# Patient Record
Sex: FEMALE | Race: WHITE | Employment: STUDENT | URBAN - METROPOLITAN AREA
[De-identification: names, ages, dates, MRNs, and addresses within clinical notes are randomized per-mention and may not be internally consistent; named-entity substitution may affect disease eponyms.]

---
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
## 2021-02-15 ENCOUNTER — OFFICE VISIT (OUTPATIENT)
Dept: FAMILY MEDICINE CLINIC | Age: 21
End: 2021-02-15
Payer: COMMERCIAL

## 2021-02-15 VITALS
OXYGEN SATURATION: 98 % | SYSTOLIC BLOOD PRESSURE: 108 MMHG | TEMPERATURE: 97.3 F | WEIGHT: 185.8 LBS | HEIGHT: 68 IN | HEART RATE: 81 BPM | BODY MASS INDEX: 28.16 KG/M2 | DIASTOLIC BLOOD PRESSURE: 63 MMHG

## 2021-02-15 DIAGNOSIS — F84.0 AUTISM SPECTRUM DISORDER: Primary | ICD-10-CM

## 2021-02-15 DIAGNOSIS — F79 INTELLECTUAL DISABILITY: ICD-10-CM

## 2021-02-15 DIAGNOSIS — G40.909 EPILEPSY UNDETERMINED AS TO FOCAL OR GENERALIZED (HCC): ICD-10-CM

## 2021-02-15 DIAGNOSIS — Z01.818 PRE-OPERATIVE CLEARANCE: ICD-10-CM

## 2021-02-15 DIAGNOSIS — Z87.898 HISTORY OF HEADACHE: ICD-10-CM

## 2021-02-15 DIAGNOSIS — Z11.59 NEED FOR HEPATITIS C SCREENING TEST: ICD-10-CM

## 2021-02-15 DIAGNOSIS — E66.3 OVERWEIGHT (BMI 25.0-29.9): ICD-10-CM

## 2021-02-15 DIAGNOSIS — E67.3 HYPERVITAMINOSIS D: ICD-10-CM

## 2021-02-15 PROBLEM — Z84.89 FAMILY HISTORY OF SUDDEN DEATH: Status: ACTIVE | Noted: 2017-01-30

## 2021-02-15 PROCEDURE — G8419 CALC BMI OUT NRM PARAM NOF/U: HCPCS | Performed by: FAMILY MEDICINE

## 2021-02-15 PROCEDURE — G8427 DOCREV CUR MEDS BY ELIG CLIN: HCPCS | Performed by: FAMILY MEDICINE

## 2021-02-15 PROCEDURE — 1036F TOBACCO NON-USER: CPT | Performed by: FAMILY MEDICINE

## 2021-02-15 PROCEDURE — 99213 OFFICE O/P EST LOW 20 MIN: CPT | Performed by: FAMILY MEDICINE

## 2021-02-15 PROCEDURE — G8484 FLU IMMUNIZE NO ADMIN: HCPCS | Performed by: FAMILY MEDICINE

## 2021-02-15 SDOH — ECONOMIC STABILITY: TRANSPORTATION INSECURITY
IN THE PAST 12 MONTHS, HAS LACK OF TRANSPORTATION KEPT YOU FROM MEETINGS, WORK, OR FROM GETTING THINGS NEEDED FOR DAILY LIVING?: NOT ASKED

## 2021-02-15 SDOH — ECONOMIC STABILITY: TRANSPORTATION INSECURITY
IN THE PAST 12 MONTHS, HAS THE LACK OF TRANSPORTATION KEPT YOU FROM MEDICAL APPOINTMENTS OR FROM GETTING MEDICATIONS?: NOT ASKED

## 2021-02-15 ASSESSMENT — PATIENT HEALTH QUESTIONNAIRE - PHQ9
SUM OF ALL RESPONSES TO PHQ9 QUESTIONS 1 & 2: 0
SUM OF ALL RESPONSES TO PHQ QUESTIONS 1-9: 2
1. LITTLE INTEREST OR PLEASURE IN DOING THINGS: 1
SUM OF ALL RESPONSES TO PHQ QUESTIONS 1-9: 2
SUM OF ALL RESPONSES TO PHQ9 QUESTIONS 1 & 2: 2
SUM OF ALL RESPONSES TO PHQ QUESTIONS 1-9: 0
SUM OF ALL RESPONSES TO PHQ QUESTIONS 1-9: 0

## 2021-02-15 ASSESSMENT — ENCOUNTER SYMPTOMS
BACK PAIN: 0
SHORTNESS OF BREATH: 0
EYE DISCHARGE: 0
BLOOD IN STOOL: 0
RECTAL PAIN: 0
EYE PAIN: 0
COLOR CHANGE: 0
CONSTIPATION: 0
NAUSEA: 0
SINUS PRESSURE: 0
VOICE CHANGE: 0
TROUBLE SWALLOWING: 0
ABDOMINAL PAIN: 0
VOMITING: 0
CHEST TIGHTNESS: 0
ANAL BLEEDING: 0
EYE REDNESS: 0
ABDOMINAL DISTENTION: 0
COUGH: 0
DIARRHEA: 0

## 2021-02-15 NOTE — PROGRESS NOTES
Subjective:      Patient ID: Fidelia Cerna is a 24 y.o. female. HPI Here for a clearance for IUD placement to regulate her periods. Seen Dr Lakia Jackson for her heavy periods but was unable to complete the eval as OP as patient was very   Nervous , so planning to have this done under anesthesia. States mood, sleep, appetite have been stable. Bowels are ok. Not sexually active ever- so Dr Lakia Jackson has not recommended a chlyamidia screen for her or pelvis-   but I did recommend that she update the HPV shots for the future. Has H/O seizures in the past - not on meds for seizures for over 6 years and has not had a seizure for this time. Used to see neuro Q 6 months, when she came off elavil 2 years ago- stopped seeing them regularly. Gets HA a couple of times a month - usually one dose  ibuprofen takes care of this. Not had a severe one needing immitrex for over 2 years now. Has pre op labs scheduled for March 2 nd and surgery scheduled fro March 10 th. Review of Systems   Constitutional: Negative for activity change, appetite change and fatigue. HENT: Negative for dental problem, ear pain, hearing loss, postnasal drip, sinus pressure, sneezing, tinnitus, trouble swallowing and voice change. Eyes: Negative for pain, discharge, redness and visual disturbance. Respiratory: Negative for cough, chest tightness and shortness of breath. Cardiovascular: Negative for chest pain, palpitations and leg swelling. Gastrointestinal: Negative for abdominal distention, abdominal pain, anal bleeding, blood in stool, constipation, diarrhea, nausea, rectal pain and vomiting. Endocrine: Negative for cold intolerance, heat intolerance, polydipsia, polyphagia and polyuria. Genitourinary: Negative for decreased urine volume, difficulty urinating, dyspareunia, dysuria, enuresis, flank pain, frequency, genital sores, hematuria, menstrual problem, pelvic pain, urgency, vaginal bleeding and vaginal discharge. Musculoskeletal: Negative for arthralgias, back pain, gait problem, joint swelling, myalgias, neck pain and neck stiffness. Skin: Negative for color change, pallor and rash. Allergic/Immunologic: Negative for environmental allergies, food allergies and immunocompromised state. Neurological: Negative for dizziness, tremors, seizures, syncope, facial asymmetry, speech difficulty, weakness, light-headedness, numbness and headaches. Hematological: Negative for adenopathy. Does not bruise/bleed easily. Psychiatric/Behavioral: Negative for agitation, behavioral problems, confusion, decreased concentration, sleep disturbance and suicidal ideas. The patient is not nervous/anxious. Objective:   Physical Exam  Constitutional:       General: She is not in acute distress. HENT:      Head: Normocephalic and atraumatic. Right Ear: External ear normal.      Left Ear: External ear normal.   Eyes:      Conjunctiva/sclera: Conjunctivae normal.      Pupils: Pupils are equal, round, and reactive to light. Neck:      Musculoskeletal: Normal range of motion. Thyroid: No thyromegaly. Trachea: No tracheal deviation. Cardiovascular:      Rate and Rhythm: Normal rate and regular rhythm. Heart sounds: No murmur. No friction rub. No gallop. Pulmonary:      Effort: Pulmonary effort is normal. No respiratory distress. Breath sounds: No stridor. No wheezing or rales. Chest:      Chest wall: No tenderness. Abdominal:      General: Bowel sounds are normal. There is no distension. Palpations: Abdomen is soft. Tenderness: There is no abdominal tenderness. There is no rebound. Musculoskeletal: Normal range of motion. Lymphadenopathy:      Cervical: No cervical adenopathy. Skin:     General: Skin is warm. Coloration: Skin is not pale. Findings: No erythema or rash. Neurological:      General: No focal deficit present. Mental Status: She is alert and oriented to person, place, and time. Mental status is at baseline. Cranial Nerves: No cranial nerve deficit. Motor: No abnormal muscle tone. Deep Tendon Reflexes: Reflexes normal.   Psychiatric:         Mood and Affect: Mood normal.         Behavior: Behavior normal.         Assessment:       Diagnosis Orders   1. Autism spectrum disorder     2. Epilepsy undetermined as to focal or generalized ([address]     3. Intellectual disability     4. Overweight (BMI 25.0-29.9)     5. History of headache     6. Hypervitaminosis D  Vitamin D 25 Hydroxy   7. Need for hepatitis C screening test  Hepatitis C Antibody   8. Pre-operative clearance  CBC    Comprehensive Metabolic Panel         Plan:      Orders Placed This Encounter   Procedures    CBC    Comprehensive Metabolic Panel    Hepatitis C Antibody    Vitamin D 25 Hydroxy       Outpatient Encounter Medications as of 2/15/2021   Medication Sig Dispense Refill    vitamin E 100 units capsule Take 180 Units by mouth daily      Thiamine 50 MG CAPS 50 mg daily      Riboflavin 100 MG TABS Take 300 mg by mouth daily      polyethylene glycol (GLYCOLAX) 17 GM/SCOOP powder take 1 CAPFUL (17GM) (DISSOLVED IN WATER) by mouth once daily      Magnesium Oxide 420 MG TABS Take 420 mg by mouth daily      ibuprofen (ADVIL;MOTRIN) 200 MG tablet Take 600 mg by mouth every 6 hours as needed      Docosahexaenoic Acid--100 MG CAPS Take 1,000 mg by mouth daily      Coenzyme Q-10 200 MG CAPS Take 200 mg by mouth 2 times daily      [DISCONTINUED] SUMAtriptan (IMITREX) 25 MG tablet TAKE 1 TABLET AT HEADACHE ONSET. MAY REPEAT 1 TABLET IN 2 HOURS IF NEEDED.   MAX OF 2 TABS/24 HOURS      [DISCONTINUED] promethazine (PHENERGAN) 25 MG tablet take 1 tablet by mouth every 6 hours if needed for nausea      [DISCONTINUED] lidocaine-prilocaine (EMLA) 2.5-2.5 % cream Apply topically  [DISCONTINUED] Cholecalciferol (VITAMIN D3) 25 MCG (1000 UT) CAPS Take 1,000 Units by mouth daily      [DISCONTINUED] TURMERIC PO Take 1,000 mg by mouth daily       No facility-administered encounter medications on file as of 2/15/2021.             Eden Amaya MD

## 2021-12-29 ENCOUNTER — HOSPITAL ENCOUNTER (EMERGENCY)
Facility: CLINIC | Age: 21
Discharge: HOME | End: 2021-12-30
Attending: EMERGENCY MEDICINE
Payer: COMMERCIAL

## 2021-12-29 VITALS
HEART RATE: 97 BPM | DIASTOLIC BLOOD PRESSURE: 84 MMHG | OXYGEN SATURATION: 100 % | RESPIRATION RATE: 18 BRPM | SYSTOLIC BLOOD PRESSURE: 128 MMHG

## 2021-12-29 DIAGNOSIS — R07.89 ATYPICAL CHEST PAIN: Primary | ICD-10-CM

## 2021-12-29 PROCEDURE — 93005 ELECTROCARDIOGRAM TRACING: CPT

## 2021-12-29 PROCEDURE — 99283 EMERGENCY DEPT VISIT LOW MDM: CPT

## 2021-12-29 ASSESSMENT — PAIN SCALES - GENERAL: PAINLEVEL_OUTOF10: 9

## 2021-12-30 ENCOUNTER — APPOINTMENT (OUTPATIENT)
Dept: GENERAL RADIOLOGY | Facility: CLINIC | Age: 21
End: 2021-12-30
Payer: COMMERCIAL

## 2021-12-30 LAB
DIRECT EXAM: NORMAL
EKG ATRIAL RATE: 92 BPM
EKG P AXIS: 61 DEGREES
EKG P-R INTERVAL: 124 MS
EKG Q-T INTERVAL: 346 MS
EKG QRS DURATION: 78 MS
EKG QTC CALCULATION (BAZETT): 427 MS
EKG R AXIS: 44 DEGREES
EKG T AXIS: 64 DEGREES
EKG VENTRICULAR RATE: 92 BPM
Lab: NORMAL
SPECIMEN DESCRIPTION: NORMAL

## 2021-12-30 PROCEDURE — 6360000002 HC RX W HCPCS: Performed by: EMERGENCY MEDICINE

## 2021-12-30 PROCEDURE — 6370000000 HC RX 637 (ALT 250 FOR IP): Performed by: EMERGENCY MEDICINE

## 2021-12-30 PROCEDURE — 87880 STREP A ASSAY W/OPTIC: CPT

## 2021-12-30 PROCEDURE — 96372 THER/PROPH/DIAG INJ SC/IM: CPT

## 2021-12-30 PROCEDURE — 71045 X-RAY EXAM CHEST 1 VIEW: CPT

## 2021-12-30 RX ORDER — KETOROLAC TROMETHAMINE 30 MG/ML
30 INJECTION, SOLUTION INTRAMUSCULAR; INTRAVENOUS ONCE
Status: COMPLETED | OUTPATIENT
Start: 2021-12-30 | End: 2021-12-30

## 2021-12-30 RX ORDER — MAGNESIUM HYDROXIDE/ALUMINUM HYDROXICE/SIMETHICONE 120; 1200; 1200 MG/30ML; MG/30ML; MG/30ML
30 SUSPENSION ORAL ONCE
Status: COMPLETED | OUTPATIENT
Start: 2021-12-30 | End: 2021-12-30

## 2021-12-30 RX ADMIN — MAGNESIUM HYDROXIDE/ALUMINUM HYDROXICE/SIMETHICONE 30 ML: 120; 1200; 1200 SUSPENSION ORAL at 00:32

## 2021-12-30 RX ADMIN — KETOROLAC TROMETHAMINE 30 MG: 30 INJECTION, SOLUTION INTRAMUSCULAR; INTRAVENOUS at 00:32

## 2021-12-30 NOTE — ED PROVIDER NOTES
eMERGENCY dEPARTMENT eNCOUnter      Pt Name: Venus Galo  MRN: [id number]  [place] 2000  Date of evaluation: 12/29/2021      CHIEF COMPLAINT       Chief Complaint   Patient presents with    Chest Pain     onset 1 hour after eating          HISTORY OF PRESENT ILLNESS    Venus Galo is a 22 y.o. female who presents emergency department complaining of sharp chest pain that happened about 1 hour after she had a meal.  Patient has no nausea no vomiting no diaphoresis no shortness of breath. Has history of anxiety headache and seizure disorder. She is afebrile nontoxic looking has been tested for coronavirus which is negative. REVIEW OF SYSTEMS       PAST MEDICAL HISTORY    has a past medical history of Anxiety, Headache, and Seizures ([facility] [address] SURGICAL HISTORY      has a past surgical history that includes Tympanostomy tube placement. CURRENT MEDICATIONS       Previous Medications    COENZYME Q-10 200 MG CAPS    Take 200 mg by mouth 2 times daily    DOCOSAHEXAENOIC ACID--100 MG CAPS    Take 1,000 mg by mouth daily    IBUPROFEN (ADVIL;MOTRIN) 200 MG TABLET    Take 600 mg by mouth every 6 hours as needed    MAGNESIUM OXIDE 420 MG TABS    Take 420 mg by mouth daily    POLYETHYLENE GLYCOL (GLYCOLAX) 17 GM/SCOOP POWDER    take 1 CAPFUL (17GM) (DISSOLVED IN WATER) by mouth once daily    RIBOFLAVIN 100 MG TABS    Take 300 mg by mouth daily    THIAMINE 50 MG CAPS    50 mg daily    VITAMIN E 100 UNITS CAPSULE    Take 180 Units by mouth daily       ALLERGIES     is allergic to sulfa antibiotics. FAMILY HISTORY     She indicated that her mother is alive. She indicated that her father is alive. family history is not on file. SOCIAL HISTORY      reports that she has never smoked. She has never used smokeless tobacco. She reports that she does not drink alcohol and does not use drugs. PHYSICAL EXAM     INITIAL VITALS:  blood pressure is 128/84 and her pulse is 97.  Her respiration is 18 and oxygen saturation is 100%. Constitutional: Alert, oriented x3, nontoxic, afebrile, answering questions appropriately, acting properly for age, in no acute distress  HEENT: Extraocular muscles intact, mucus membranes moist, TMs clear bilaterally, no posterior pharyngeal erythema or exudates, Pupils equal, round, reactive to light,   Neck: Trachea midline, Supple without lymphadenopathy, no posterior midline neck tenderness to palpation  Cardiovascular: Regular rhythm and rate no S3, S4, or murmurs  Respiratory: Clear to auscultation bilaterally no wheezes, rhonchi, rales, no respiratory distress  Gastrointestinal: Soft, nontender, nondistended, positive bowel sounds. No rebound, rigidity, or guarding. Musculoskeletal: No extremity pain or swelling  Neurologic: Moving all 4 extremities without difficulty there are no gross focal neurologic deficits  Skin: Warm and dry      DIFFERENTIAL DIAGNOSIS/ MDM:     Pain most likely musculoskeletal in etiology patient will get a portable chest x-ray some medicines and EKG and some Maalox and will reevaluate her. I do not believe this patient needs cardiac enzymes. DIAGNOSTIC RESULTS     EKG: All EKG's are interpreted by the Emergency Department Physician who either signs or Co-signs this chart in the absence of a cardiologist.    EKG is a sinus rhythm at 92 bpm, OK interval 0.124, QRS duration 0.078, QTc 427 ms. No ST or T wave changes indicating ischemia. Not indicated unless otherwise documented above    LABS:  Results for orders placed or performed during the hospital encounter of 12/29/21   Strep Screen Group A Throat    Specimen: Throat   Result Value Ref Range    Specimen Description . THROAT     Special Requests NOT REPORTED     Direct Exam       Rapid Strep A negative. A negative Rapid Group A Strep Screen result does not rule out the possibility of Group A Streptococci in the specimen.  A Group A Strep DNA test is available upon request.       Not indicated unless otherwise documented above    RADIOLOGY:   I reviewed the radiologist interpretations:  XR CHEST PORTABLE   Final Result   Normal examination. Not indicated unless otherwise documented above    EMERGENCY DEPARTMENT COURSE:     The patient was given the following medications:  Orders Placed This Encounter   Medications    ketorolac (TORADOL) injection 30 mg    aluminum & magnesium hydroxide-simethicone (MAALOX) 427-346-86 MG/5ML suspension 30 mL        Vitals:    Vitals:    12/29/21 2356   BP: 128/84   Pulse: 97   Resp: 18   SpO2: 100%     -------------------------  /84   Pulse 97   Resp 18   SpO2 100%         I have reviewed the disposition diagnosis with the patient and or their family/guardian. I have answered their questions and given discharge instructions. They voiced understanding of these instructions and did not have any further questions or complaints. CRITICAL CARE:    None    CONSULTS:    None    PROCEDURES:    None      OARRS Report if indicated             FINAL IMPRESSION      1. Atypical chest pain          DISPOSITION/PLAN   DISPOSITION Decision To Discharge  I have reviewed the disposition diagnosis with the patient and or their family/guardian. I have answered their questions and given discharge instructions. They voiced understanding of these instructions and did not have any further questions or complaints. Reevaluation: Patient's x-ray is negative patient's EKG has been unremarkable. I believe patient has musculoskeletal chest pain uncertain etiology. Patient has not shown any hypoxemia or tachycardia patient can be discharged home to follow-up with her own doctors as she is got some mild relief from Toradol.     PATIENT REFERRED TO:  Leo Griffin MD  [facility]  [place] [phone]    In 2 days        DISCHARGE MEDICATIONS:  New Prescriptions    No medications on file       (Please note that portions of this note were completed with a voice recognition program.  Efforts were made to edit the dictations but occasionally words are mis-transcribed.)    Yunior Tamyao MD  Attending Emergency Physician            Yunior Tamayo MD  12/30/21 [id number]

## 2023-04-24 ENCOUNTER — HOSPITAL ENCOUNTER (OUTPATIENT)
Dept: OCCUPATIONAL THERAPY | Age: 23
Setting detail: THERAPIES SERIES
Discharge: HOME | End: 2023-04-24
Payer: MEDICAID

## 2023-04-24 PROCEDURE — 97537 COMMUNITY/WORK REINTEGRATION: CPT

## 2023-04-24 PROCEDURE — 97166 OT EVAL MOD COMPLEX 45 MIN: CPT

## 2023-04-24 NOTE — PROGRESS NOTES
[facility]  Rehabilitation Services   Occupational Therapy  Evaluation  Date: 23  Patient Name: Kelvin Mclean      MRN: [id number]  Account: [de-identified]   : 2000  (25 y.o.)  Gender: female     [facility] Occup[facility]              [address] [place] [address] [address]              Phone: [phone]              Fax: [phone]     Occupational Therapy Initial  Evaluation      Referring Physician: Roland Aguayo MD             Insurance: [address] [place] [address],[facility];  visits; [address] AFTER EVAL  Primary Care Physician: Ella Son    Medical Diagnostic Code(s): Autism spectrum disorder   Rehab Diagnostic Code(s): Development delay     Date of symptom onset: Developmental delay  Next 's appt.:     Total Visits:                  Cx/Ns: 0    Precautions: Mild intellectual disability    Fall Risk/Comorbidities: Cardiomyopathy, sleep disorder    Subjective: Patient working part time at a [employer] and would like to be able to transport herself to work and back. Objective:    Reason for Referral: [place] with community mobility including transportation to work. Medical History: Autism spectrum disorder with developmental delay. Sees developmental specialist routinely at [facility]. Difficulty coping with change. Working at [employer] 12 hours per week. Long history of sleep disorder. Baseline fatigue. Annual visits with Cardiology at [facility] for a rare mitochondrial disorder and cardiomyopathy. Seizure disorder, last seizure 9 years ago. History of headaches, increased frequency recently. Seizure History: patient has been seizure free for the past 9 years and has been off of seizure medications for the past 7 years. Current Medications:    Coenzyme Q-10 200 MG CAPS Take 1 Cap (200 mg total) by mouth 2 times a day.  60 Cap 12    Docosahexaenoic Acid (DHA PO) Take

## 2023-05-15 ENCOUNTER — HOSPITAL ENCOUNTER (OUTPATIENT)
Dept: OCCUPATIONAL THERAPY | Age: 23
Setting detail: THERAPIES SERIES
Discharge: HOME | End: 2023-05-15
Payer: MEDICAID

## 2023-05-15 PROCEDURE — 97537 COMMUNITY/WORK REINTEGRATION: CPT

## 2023-05-22 ENCOUNTER — HOSPITAL ENCOUNTER (OUTPATIENT)
Dept: OCCUPATIONAL THERAPY | Age: 23
Setting detail: THERAPIES SERIES
Discharge: HOME | End: 2023-05-22
Payer: MEDICAID

## 2023-05-22 PROCEDURE — 97537 COMMUNITY/WORK REINTEGRATION: CPT

## 2023-06-05 ENCOUNTER — HOSPITAL ENCOUNTER (OUTPATIENT)
Dept: OCCUPATIONAL THERAPY | Age: 23
Setting detail: THERAPIES SERIES
Discharge: HOME | End: 2023-06-05
Payer: MEDICAID

## 2023-06-05 PROCEDURE — 97537 COMMUNITY/WORK REINTEGRATION: CPT

## 2023-06-05 NOTE — PROGRESS NOTES
visual skills, vehicle positioning and handling skills and strategic skills required for driving. Patient Stated Goals: To secure her temporary driving permit. Pt. Education:  [x] Yes   [x] Reviewed   Method of Education: [x] Verbal  [] Demo    Comprehension of Education:  [x] Verbalizes understanding. [] Demonstrates understanding. [x] Needs review.   [] Demonstrates/verbalizes      PLAN (FREQUENCY & DURATION): 1 time per week for a total of up to 30 visits      Treatment Charges: Mins Units   [] Evaluation       [x] Community Reintegration 60 4   Total Treatment Time 60        Time In: 11:00 am Time Out: 12:00 pm      Electronically signed by: Kendra Tinsley MS, OTR/L, LICDC, CDRS

## 2023-06-19 ENCOUNTER — HOSPITAL ENCOUNTER (OUTPATIENT)
Dept: OCCUPATIONAL THERAPY | Age: 23
Setting detail: THERAPIES SERIES
Discharge: HOME | End: 2023-06-19
Payer: MEDICAID

## 2023-06-19 PROCEDURE — 97537 COMMUNITY/WORK REINTEGRATION: CPT

## 2023-06-19 NOTE — PROGRESS NOTES
[facility]  Rehabilitation Services  Occupational Therapy  Rehabilitation Treatment Note  Date: 23  Patient Name: Juan Antonio Boyd    MRN: [id number]  Account: [de-identified]   : 2000  (25 y.o.) Gender: female     [facility] Outpatient Occupational Therapy              [address] [facility] [address]              Phone: [phone]              Fax: [phone]     Occupational Therapy Initial  Evaluation      Referring Physician: Magda Aguayo MD             Insurance: [address] [place] [address],[facility];  visits; Jakobi 69 AFTER EVAL  Primary Care Physician: Barrett Leahy     Medical Diagnostic Code(s): Autism spectrum disorder   Rehab Diagnostic Code(s): Development delay      Date of symptom onset: Developmental delay  Next 's appt.:      Total Visits:                       Cx/Ns: 0     Precautions: Mild intellectual disability     Fall Risk/Comorbidities: Cardiomyopathy, sleep disorder     Subjective: Patient working part time at a [employer] and would like to be able to transport herself to work and back; patient had 4 wisdom teeth removed on 21 and reported feeling fatigue and intermittent pain. Objective:     Reason for Referral: [place] with community mobility including transportation to work with diagnosis of Autism spectrum disorder with developmental delay. Participation in Rehabilitation Therapies: patient has participated on comprehensive OT & SLP in school and in SLP as an outpatient and outpatient at [address] [place] [address] intervention training for family to optimize the learning environment ages 5-8 y/o. Patient had IQ testing and she required special education in school and has a 3rd grade reading level & uses assistive devices for math. Driving History: patient is hoping to pursue her temporary driving permit.   Driving Goals: patient hoping to pursue local, daytime

## 2023-07-10 ENCOUNTER — HOSPITAL ENCOUNTER (OUTPATIENT)
Dept: OCCUPATIONAL THERAPY | Age: 23
Setting detail: THERAPIES SERIES
Discharge: HOME | End: 2023-07-10
Payer: MEDICAID

## 2023-07-10 PROCEDURE — 97537 COMMUNITY/WORK REINTEGRATION: CPT

## 2023-07-10 NOTE — PROGRESS NOTES
Demonstrates understanding. [x] Needs review.   [] Demonstrates/verbalizes      PLAN (FREQUENCY & DURATION): 1 time per week for a total of up to 30 visits      Treatment Charges: Mins Units   [] Evaluation       [x] Community Reintegration 60 4   Total Treatment Time 60        Time In: 12:45:00 pm Time Out: 1:45 pm      Electronically signed by: Naren Vizcaino MS, OTR/L, LICDC, CDRS

## 2023-07-17 ENCOUNTER — HOSPITAL ENCOUNTER (OUTPATIENT)
Dept: OCCUPATIONAL THERAPY | Age: 23
Setting detail: THERAPIES SERIES
Discharge: HOME | End: 2023-07-17
Payer: MEDICAID

## 2023-07-17 PROCEDURE — 97537 COMMUNITY/WORK REINTEGRATION: CPT

## 2023-07-24 ENCOUNTER — HOSPITAL ENCOUNTER (OUTPATIENT)
Dept: OCCUPATIONAL THERAPY | Age: 23
Setting detail: THERAPIES SERIES
Discharge: HOME | End: 2023-07-24
Payer: MEDICAID

## 2023-07-24 PROCEDURE — 97537 COMMUNITY/WORK REINTEGRATION: CPT
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
## 2023-08-01 ENCOUNTER — HOSPITAL ENCOUNTER (OUTPATIENT)
Dept: OCCUPATIONAL THERAPY | Age: 23
Setting detail: THERAPIES SERIES
Discharge: HOME | End: 2023-08-01
Payer: MEDICAID

## 2023-08-01 PROCEDURE — 97537 COMMUNITY/WORK REINTEGRATION: CPT

## 2023-08-01 NOTE — PROGRESS NOTES
[facility]  Occupational Therapy  Rehabilitation Treatment Note  Date: 23  Patient Name: Bonifacio Correa    MRN: [id number]  Account: [de-identified]   : 2000  (25 y.o.) Gender: female     [facility] Out[facility] Occupational Therapy              [address]              Phone: [phone]              Fax: [phone]       Referring Physician: Christine Aguayo MD             Insurance:  [facility];  visits; [facility]  Primary Care Physician: Ilda Childs     Medical Diagnostic Code(s): Autism spectrum disorder   Rehab Diagnostic Code(s): Development delay      Date of symptom onset: Developmental delay  Next 's appt.:      Total Visits:                       Cx/Ns: 0     Precautions: Mild intellectual disability     Fall Risk/Comorbidities: Cardiomyopathy, sleep disorder     Subjective: Patient reported that she plans to request a  to assist her with studying for her written driving examination. working part time at [employer] and would like to be able to transport herself to work and back; patient reports feeling 70% confident of her skills. Objective:     Reason for Referral: [place] with community mobility including transportation to work with diagnosis of Autism spectrum disorder with developmental delay. Participation in Rehabilitation Therapies: patient has participated on comprehensive OT & SLP in school and in SLP as an outpatient and outpatient at [address] [place] [address] intervention training for family to optimize the learning environment ages 10-10 y/o. Patient had IQ testing and she required special education in school and has a 3rd grade reading level & uses assistive devices for math. Driving History: patient is hoping to pursue her temporary driving permit.      Driving Goals: patient hoping to pursue local,

## 2023-09-11 ENCOUNTER — HOSPITAL ENCOUNTER (OUTPATIENT)
Dept: OCCUPATIONAL THERAPY | Age: 23
Setting detail: THERAPIES SERIES
Discharge: HOME | End: 2023-09-11
Payer: MEDICAID

## 2023-09-11 PROCEDURE — 97537 COMMUNITY/WORK REINTEGRATION: CPT

## 2023-09-11 NOTE — PROGRESS NOTES
[facility]  Occupational Therapy  Rehabilitation  Treatment Note    Date: 23  Patient Name: Yenny Barrientos    MRN: [id number]  Account: [de-identified]   : 2000  (25 y.o.) Gender: female     [facility] Outpatient Occupational Therapy              [address]              Phone: [phone]              Fax: [phone]       Referring Physician: Rosalind Aguayo MD             Insurance:  [employer] [facility];  visits; [facility]  Primary Care Physician: Ashanti Anand     Medical Diagnostic Code(s): Autism spectrum disorder   Rehab Diagnostic Code(s): Development delay      Date of symptom onset: Developmental delay  Next Dr.'s appt.:      Total Visits: 10/30                      Cx/Ns: 0     Precautions: Mild intellectual disability     Fall Risk/Comorbidities: Cardiomyopathy, sleep disorder     Subjective: Patient reported that she plans to request a  to assist her with studying for her written driving examination. Objective:     Reason for Referral: [place] with community mobility including transportation to work (employed at [employer], 12 hours per week). Medical History: Autism spectrum disorder with developmental delay & is followed by a Developmental Specialist at [facility]. History of sleep disorder. Baseline fatigue. Annual visits with Cardiology at [facility] for a rare mitochondrial disorder and cardiomyopathy. Seizure disorder, last seizure 9 years ago. History of headaches, increased frequency recently. Seizure History: patient has been seizure free for the past 9 years and has been off of seizure medications for the past 7 years.      Participation in Rehabilitation Therapies: patient has participated on comprehensive OT & SLP in school and in SLP as an outpatient and outpatient at [address] [place] [address] [facility]

## 2023-09-18 ENCOUNTER — APPOINTMENT (OUTPATIENT)
Dept: OCCUPATIONAL THERAPY | Age: 23
End: 2023-09-18
Payer: MEDICAID